# Patient Record
Sex: MALE | Race: WHITE | Employment: OTHER | URBAN - METROPOLITAN AREA
[De-identification: names, ages, dates, MRNs, and addresses within clinical notes are randomized per-mention and may not be internally consistent; named-entity substitution may affect disease eponyms.]

---

## 2017-05-22 PROCEDURE — 88305 TISSUE EXAM BY PATHOLOGIST: CPT | Performed by: INTERNAL MEDICINE

## 2017-07-21 ENCOUNTER — OFFICE VISIT (OUTPATIENT)
Dept: WOUND CARE | Facility: HOSPITAL | Age: 67
End: 2017-07-21
Attending: NURSE PRACTITIONER
Payer: MEDICARE

## 2017-07-21 ENCOUNTER — HOSPITAL ENCOUNTER (OUTPATIENT)
Dept: LAB | Facility: HOSPITAL | Age: 67
Discharge: HOME | End: 2017-07-21
Attending: NURSE PRACTITIONER
Payer: MEDICARE

## 2017-07-21 DIAGNOSIS — F17.218 NICOTINE DEPENDENCE, CIGARETTES, WITH OTHER NICOTINE-INDUCED DISORDERS: ICD-10-CM

## 2017-07-21 DIAGNOSIS — I87.332 CHRONIC VENOUS HYPERTENSION (IDIOPATHIC) WITH ULCER AND INFLAMMATION OF LEFT LOWER EXTREMITY (HCC): Primary | ICD-10-CM

## 2017-07-21 DIAGNOSIS — L97.929 CHRONIC VENOUS HYPERTENSION (IDIOPATHIC) WITH ULCER AND INFLAMMATION OF LEFT LOWER EXTREMITY (HCC): Primary | ICD-10-CM

## 2017-07-21 DIAGNOSIS — L97.822 NON-PRESSURE CHRONIC ULCER OF OTHER PART OF LEFT LOWER LEG WITH FAT LAYER EXPOSED (HCC): ICD-10-CM

## 2017-07-21 LAB
ALBUMIN SERPL-MCNC: 3.6 G/DL (ref 3.5–4.8)
ALP LIVER SERPL-CCNC: 48 U/L (ref 45–117)
ALT SERPL-CCNC: 26 U/L (ref 17–63)
AST SERPL-CCNC: 16 U/L (ref 15–41)
BASOPHILS # BLD AUTO: 0.07 X10(3) UL (ref 0–0.1)
BASOPHILS NFR BLD AUTO: 0.9 %
BILIRUB SERPL-MCNC: 0.4 MG/DL (ref 0.1–2)
BUN BLD-MCNC: 22 MG/DL (ref 8–20)
CALCIUM BLD-MCNC: 8.6 MG/DL (ref 8.3–10.3)
CHLORIDE: 108 MMOL/L (ref 101–111)
CO2: 28 MMOL/L (ref 22–32)
CREAT BLD-MCNC: 1.05 MG/DL (ref 0.7–1.3)
EOSINOPHIL # BLD AUTO: 0.33 X10(3) UL (ref 0–0.3)
EOSINOPHIL NFR BLD AUTO: 4.1 %
ERYTHROCYTE [DISTWIDTH] IN BLOOD BY AUTOMATED COUNT: 13.6 % (ref 11.5–16)
GLUCOSE BLD-MCNC: 101 MG/DL (ref 70–99)
HCT VFR BLD AUTO: 44.4 % (ref 37–53)
HGB BLD-MCNC: 14.7 G/DL (ref 13–17)
IMMATURE GRANULOCYTE COUNT: 0.02 X10(3) UL (ref 0–1)
IMMATURE GRANULOCYTE RATIO %: 0.2 %
LYMPHOCYTES # BLD AUTO: 2.06 X10(3) UL (ref 0.9–4)
LYMPHOCYTES NFR BLD AUTO: 25.5 %
M PROTEIN MFR SERPL ELPH: 7.1 G/DL (ref 6.1–8.3)
MCH RBC QN AUTO: 32 PG (ref 27–33.2)
MCHC RBC AUTO-ENTMCNC: 33.1 G/DL (ref 31–37)
MCV RBC AUTO: 96.5 FL (ref 80–99)
MONOCYTES # BLD AUTO: 0.77 X10(3) UL (ref 0.1–0.6)
MONOCYTES NFR BLD AUTO: 9.5 %
NEUTROPHIL ABS PRELIM: 4.82 X10 (3) UL (ref 1.3–6.7)
NEUTROPHILS # BLD AUTO: 4.82 X10(3) UL (ref 1.3–6.7)
NEUTROPHILS NFR BLD AUTO: 59.8 %
PLATELET # BLD AUTO: 266 10(3)UL (ref 150–450)
POTASSIUM SERPL-SCNC: 4.3 MMOL/L (ref 3.6–5.1)
PREALBUMIN: 26.9 MG/DL (ref 20–40)
RBC # BLD AUTO: 4.6 X10(6)UL (ref 3.8–5.8)
RED CELL DISTRIBUTION WIDTH-SD: 48.4 FL (ref 35.1–46.3)
SODIUM SERPL-SCNC: 141 MMOL/L (ref 136–144)
WBC # BLD AUTO: 8.1 X10(3) UL (ref 4–13)

## 2017-07-21 PROCEDURE — 99215 OFFICE O/P EST HI 40 MIN: CPT

## 2017-07-21 PROCEDURE — 29581 APPL MULTLAYER CMPRN SYS LEG: CPT

## 2017-07-21 PROCEDURE — 36415 COLL VENOUS BLD VENIPUNCTURE: CPT | Performed by: NURSE PRACTITIONER

## 2017-07-21 PROCEDURE — 80053 COMPREHEN METABOLIC PANEL: CPT | Performed by: NURSE PRACTITIONER

## 2017-07-21 PROCEDURE — 84134 ASSAY OF PREALBUMIN: CPT | Performed by: NURSE PRACTITIONER

## 2017-07-21 PROCEDURE — 85025 COMPLETE CBC W/AUTO DIFF WBC: CPT | Performed by: NURSE PRACTITIONER

## 2017-07-21 NOTE — PROGRESS NOTES
Chief Complaint  This information was obtained from the patient  Patient is here for a wound on his left lower leg. Patient states he scraped his leg on a lawn ornament in June.  Patient states he has been putting essential oil and he was putting lambs ear Current every day smoker, Marital Status - , Occupation - retired post office , Alcohol Use - occasional , Tobacco Use - pack and a half of cigerettes , Caffeine Use - All day    Past Medical History  This information was obtained from the patient, Daily Probiotic 2.5 billion cell capsule oral capsule oral  tamsulosin 0.4 mg capsule oral capsule,extended release 24hr oral  Calcium Magnesium 500 mg calcium-250 mg tablet oral tablet oral  cefadroxil 500 mg capsule oral capsule oral  flaxseed oral powde Wound #1 Left, Anterior Lower Leg is a chronic Full Thickness Venous Ulcer and has received a status of Not Healed. Initial wound encounter measurements are 1cm length x 1cm width x 0.1cm depth, with an area of 1 sq cm and a volume of 0.1 cubic cm.  No tunn patient is new to clinic. pcp is non ehv. I spent 30 minutes with the patient. See plan. More than 50% of that time was face to face and spent counseling the patient and/or on coordination of care.  The diagnosis, prognosis, and general treatment was explai look for Keny by [employer] (These are essential branch chain amino acids that help with tissue building and wound healing) and take 2 packets/day.  you can order online at [employer] or [employer] or some [employer] carry it or can order it for you  Decrease salt

## 2017-07-25 ENCOUNTER — OFFICE VISIT (OUTPATIENT)
Dept: WOUND CARE | Facility: HOSPITAL | Age: 67
End: 2017-07-25
Attending: NURSE PRACTITIONER
Payer: MEDICARE

## 2017-07-25 DIAGNOSIS — F17.218 NICOTINE DEPENDENCE, CIGARETTES, WITH OTHER NICOTINE-INDUCED DISORDERS: ICD-10-CM

## 2017-07-25 DIAGNOSIS — L97.929 CHRONIC VENOUS HYPERTENSION (IDIOPATHIC) WITH ULCER AND INFLAMMATION OF LEFT LOWER EXTREMITY (HCC): Primary | ICD-10-CM

## 2017-07-25 DIAGNOSIS — I87.332 CHRONIC VENOUS HYPERTENSION (IDIOPATHIC) WITH ULCER AND INFLAMMATION OF LEFT LOWER EXTREMITY (HCC): Primary | ICD-10-CM

## 2017-07-25 DIAGNOSIS — L97.822 NON-PRESSURE CHRONIC ULCER OF OTHER PART OF LEFT LOWER LEG WITH FAT LAYER EXPOSED (HCC): ICD-10-CM

## 2017-07-25 PROCEDURE — 29581 APPL MULTLAYER CMPRN SYS LEG: CPT

## 2017-07-28 ENCOUNTER — OFFICE VISIT (OUTPATIENT)
Dept: WOUND CARE | Facility: HOSPITAL | Age: 67
End: 2017-07-28
Attending: NURSE PRACTITIONER
Payer: MEDICARE

## 2017-07-28 DIAGNOSIS — F17.218 NICOTINE DEPENDENCE, CIGARETTES, WITH OTHER NICOTINE-INDUCED DISORDERS: ICD-10-CM

## 2017-07-28 DIAGNOSIS — L97.822 NON-PRESSURE CHRONIC ULCER OF OTHER PART OF LEFT LOWER LEG WITH FAT LAYER EXPOSED (HCC): ICD-10-CM

## 2017-07-28 DIAGNOSIS — L97.929 CHRONIC VENOUS HYPERTENSION (IDIOPATHIC) WITH ULCER AND INFLAMMATION OF LEFT LOWER EXTREMITY (HCC): Primary | ICD-10-CM

## 2017-07-28 DIAGNOSIS — I87.332 CHRONIC VENOUS HYPERTENSION (IDIOPATHIC) WITH ULCER AND INFLAMMATION OF LEFT LOWER EXTREMITY (HCC): Primary | ICD-10-CM

## 2017-07-28 PROCEDURE — 99213 OFFICE O/P EST LOW 20 MIN: CPT

## 2017-09-25 ENCOUNTER — CHARTING TRANS (OUTPATIENT)
Dept: OTHER | Age: 67
End: 2017-09-25

## 2017-12-14 ENCOUNTER — OFFICE VISIT (OUTPATIENT)
Dept: WOUND CARE | Facility: HOSPITAL | Age: 67
End: 2017-12-14
Attending: NURSE PRACTITIONER
Payer: MEDICARE

## 2017-12-14 DIAGNOSIS — F17.218 NICOTINE DEPENDENCE, CIGARETTES, WITH OTHER NICOTINE-INDUCED DISORDERS: ICD-10-CM

## 2017-12-14 DIAGNOSIS — L97.929 CHRONIC VENOUS HYPERTENSION (IDIOPATHIC) WITH ULCER AND INFLAMMATION OF LEFT LOWER EXTREMITY (HCC): Primary | ICD-10-CM

## 2017-12-14 DIAGNOSIS — L97.822 NON-PRESSURE CHRONIC ULCER OF OTHER PART OF LEFT LOWER LEG WITH FAT LAYER EXPOSED (HCC): ICD-10-CM

## 2017-12-14 DIAGNOSIS — I87.332 CHRONIC VENOUS HYPERTENSION (IDIOPATHIC) WITH ULCER AND INFLAMMATION OF LEFT LOWER EXTREMITY (HCC): Primary | ICD-10-CM

## 2017-12-14 PROCEDURE — 99214 OFFICE O/P EST MOD 30 MIN: CPT

## 2017-12-14 NOTE — PROGRESS NOTES
Subjective    Chief Complaint  This information was obtained from the patient  Pt here for initial visit, he reports approx. 2 weeks ago dry rough skin appeared to left lower leg.  He as putting hydrogen peroxide and essential oil after which leg festered a This information was obtained from the patient, chart  Current every day smoker - started vaping last week, Marital Status - , Occupation - retired post office , Alcohol Use - occasional , Tobacco Use - pack and a half of cigerettes , Caffeine Use - Cardiovascular (Central/Peripheral): Chest Pain, Dyspnea on Exertion, Intermittent Claudication, Lower extremity (leg) resting pain  Gastrointestinal (GI): Bowel Incontinence, Change in Bowel Habits, Nausea / Vomiting / Diarrhea (N/V/D), Loss of Appetite, RRR, S1, S2, No murmur, rubs or gallops. . Andrei +2 pedal pulses. Left leg erythema and significant edema, capillary refill <3, hair growth to bilateral LE. Musculoskeletal:  Steady gait . Integumentary (Hair, Skin)  Hives noted from head to toe.  Part Right Extremity colors, hair growth, and conditions:   Extremity Color: WNL   Hair Growth on Extremity: Yes   Temperature of Extremity: Warm   Capillary Refill: < 3 seconds      Additional Information  RIGHT- Heel to back of knee Measurement: 18.5 inches Initial visit for parital thckness wound. Patient with new DVT and chronic DVT per u/s in February. Pt being followed by Dr. Sherlyn Ledesma. Also presents with cellulitis and allergic reaction. Will prescribe steroids and antibiotics.  Erythema demarcated and patie

## 2017-12-20 ENCOUNTER — APPOINTMENT (OUTPATIENT)
Dept: WOUND CARE | Facility: HOSPITAL | Age: 67
End: 2017-12-20
Attending: NURSE PRACTITIONER
Payer: MEDICARE

## 2017-12-21 ENCOUNTER — OFFICE VISIT (OUTPATIENT)
Dept: WOUND CARE | Facility: HOSPITAL | Age: 67
End: 2017-12-21
Attending: NURSE PRACTITIONER
Payer: MEDICARE

## 2017-12-21 DIAGNOSIS — L97.822 NON-PRESSURE CHRONIC ULCER OF OTHER PART OF LEFT LOWER LEG WITH FAT LAYER EXPOSED (HCC): ICD-10-CM

## 2017-12-21 DIAGNOSIS — I87.332 CHRONIC VENOUS HYPERTENSION (IDIOPATHIC) WITH ULCER AND INFLAMMATION OF LEFT LOWER EXTREMITY (HCC): Primary | ICD-10-CM

## 2017-12-21 DIAGNOSIS — L97.929 CHRONIC VENOUS HYPERTENSION (IDIOPATHIC) WITH ULCER AND INFLAMMATION OF LEFT LOWER EXTREMITY (HCC): Primary | ICD-10-CM

## 2017-12-21 DIAGNOSIS — F17.218 NICOTINE DEPENDENCE, CIGARETTES, WITH OTHER NICOTINE-INDUCED DISORDERS: ICD-10-CM

## 2017-12-21 PROCEDURE — 29581 APPL MULTLAYER CMPRN SYS LEG: CPT

## 2017-12-21 NOTE — PROGRESS NOTES
Subjective    Chief Complaint  This information was obtained from the patient  Patient is here for a wound care follow up. He feels that the wound is doing well, and denies any current pain.     Allergies  cashews , ciprofloxacin, naproxen, shrimp, sulfate, Psychiatric: Depression (daughter  unexpectedly in 2017)    Patient denies complaints or symptoms related to:   Constitutional Symptoms (General Health): Fever  Eyes: Glasses / Contacts  Ear/Nose/Mouth/Throat: Hearing Loss / Aid  Respiratory: Mar Dominguez Wound #2 Left Lower Leg is a chronic Partial Thickness Venous Ulcer and has received a status of resolved. Subsequent wound encounter measurements are 0cm length x 0cm width with no measurable depth, with an area of 0 sq cm . No tunneling has been noted.  Patrica Ignacio Steroid cream/ointment - Clobetasol applied in clinic  Moisturizer to surrounding skin. - Aquaphor  Change dressing every: - 1 week    Additional Orders:    Compression Therapy:  Coflex calamine unna boot  Avoid prolonged standing in one place.   Elevate le

## 2017-12-28 ENCOUNTER — OFFICE VISIT (OUTPATIENT)
Dept: WOUND CARE | Facility: HOSPITAL | Age: 67
End: 2017-12-28
Attending: NURSE PRACTITIONER
Payer: MEDICARE

## 2017-12-28 DIAGNOSIS — I87.332 CHRONIC VENOUS HYPERTENSION (IDIOPATHIC) WITH ULCER AND INFLAMMATION OF LEFT LOWER EXTREMITY (HCC): Primary | ICD-10-CM

## 2017-12-28 DIAGNOSIS — L97.929 CHRONIC VENOUS HYPERTENSION (IDIOPATHIC) WITH ULCER AND INFLAMMATION OF LEFT LOWER EXTREMITY (HCC): Primary | ICD-10-CM

## 2017-12-28 DIAGNOSIS — F17.218 NICOTINE DEPENDENCE, CIGARETTES, WITH OTHER NICOTINE-INDUCED DISORDERS: ICD-10-CM

## 2017-12-28 DIAGNOSIS — L97.822 NON-PRESSURE CHRONIC ULCER OF OTHER PART OF LEFT LOWER LEG WITH FAT LAYER EXPOSED (HCC): ICD-10-CM

## 2017-12-28 PROCEDURE — 99212 OFFICE O/P EST SF 10 MIN: CPT

## 2017-12-28 NOTE — PROGRESS NOTES
Subjective    Chief Complaint  This information was obtained from the patient  Patient presents today for f/u of LLL wound. Pt states he believes the prior redness is due to an allergy to Eliquis.      Allergies  cashews , ciprofloxacin, naproxen, shrimp, s Genitourinary (): Decreased Force of Stream (On flomax)  Integumentary (Hair/Skin/Nails): Dryness, Rash, Hyperpigmentation, Itching  Psychiatric: Depression (daughter  unexpectedly in 2017)    Patient denies complaints or symptoms related to: Andrei +2 pedal pulses. No LE pain or edema, Capillary refill < 3 seconds. Digits are warm. Toenails are wnl for color, thickness and hygeine. + hairgrowth on legs and feet. .     Integumentary (Hair, Skin)  Warm, dry, well perfused, no rashes or lesions. M32.890: Non-pressure chronic ulcer of other part of left lower leg limited to breakdown of skin  R60.9: Edema, unspecified        Plan    Additional Orders:    Wound Cleansing & Dressings  May shower and/or cleanse wound with mild soap and water.   Steroid

## 2018-01-16 ENCOUNTER — HOSPITAL ENCOUNTER (OUTPATIENT)
Dept: INTERVENTIONAL RADIOLOGY/VASCULAR | Facility: HOSPITAL | Age: 68
Discharge: HOME | End: 2018-01-16
Attending: SURGERY | Admitting: SURGERY
Payer: MEDICARE

## 2018-01-16 VITALS
OXYGEN SATURATION: 93 % | HEIGHT: 72 IN | WEIGHT: 240 LBS | DIASTOLIC BLOOD PRESSURE: 68 MMHG | TEMPERATURE: 98 F | HEART RATE: 62 BPM | BODY MASS INDEX: 32.51 KG/M2 | RESPIRATION RATE: 16 BRPM | SYSTOLIC BLOOD PRESSURE: 117 MMHG

## 2018-01-16 DIAGNOSIS — L97.329 VENOUS ULCER OF ANKLE, LEFT (HCC): ICD-10-CM

## 2018-01-16 DIAGNOSIS — I83.023 VENOUS ULCER OF ANKLE, LEFT (HCC): ICD-10-CM

## 2018-01-16 PROCEDURE — B44GZZ3 ULTRASONOGRAPHY OF LEFT LOWER EXTREMITY ARTERIES, INTRAVASCULAR: ICD-10-PCS | Performed by: SURGERY

## 2018-01-16 PROCEDURE — B51CYZZ FLUOROSCOPY OF LEFT LOWER EXTREMITY VEINS USING OTHER CONTRAST: ICD-10-PCS | Performed by: SURGERY

## 2018-01-16 PROCEDURE — B549ZZ3 ULTRASONOGRAPHY OF INFERIOR VENA CAVA, INTRAVASCULAR: ICD-10-PCS | Performed by: SURGERY

## 2018-01-16 PROCEDURE — 76937 US GUIDE VASCULAR ACCESS: CPT

## 2018-01-16 PROCEDURE — 36005 INJECTION EXT VENOGRAPHY: CPT

## 2018-01-16 PROCEDURE — 37253 INTRVASC US NONCORONARY ADDL: CPT

## 2018-01-16 PROCEDURE — 36012 PLACE CATHETER IN VEIN: CPT

## 2018-01-16 PROCEDURE — 75820 VEIN X-RAY ARM/LEG: CPT

## 2018-01-16 PROCEDURE — B44LZZ3 ULTRASONOGRAPHY OF FEMORAL ARTERY, INTRAVASCULAR: ICD-10-PCS | Performed by: SURGERY

## 2018-01-16 PROCEDURE — 99152 MOD SED SAME PHYS/QHP 5/>YRS: CPT

## 2018-01-16 PROCEDURE — 37252 INTRVASC US NONCORONARY 1ST: CPT

## 2018-01-16 RX ORDER — MIDAZOLAM HYDROCHLORIDE 1 MG/ML
INJECTION INTRAMUSCULAR; INTRAVENOUS
Status: COMPLETED
Start: 2018-01-16 | End: 2018-01-16

## 2018-01-16 RX ORDER — HEPARIN SODIUM 5000 [USP'U]/ML
INJECTION, SOLUTION INTRAVENOUS; SUBCUTANEOUS
Status: COMPLETED
Start: 2018-01-16 | End: 2018-01-16

## 2018-01-16 RX ORDER — METHYLPREDNISOLONE SODIUM SUCCINATE 125 MG/2ML
INJECTION, POWDER, LYOPHILIZED, FOR SOLUTION INTRAMUSCULAR; INTRAVENOUS
Status: COMPLETED
Start: 2018-01-16 | End: 2018-01-16

## 2018-01-16 RX ORDER — SODIUM CHLORIDE 9 MG/ML
INJECTION, SOLUTION INTRAVENOUS CONTINUOUS
Status: DISCONTINUED | OUTPATIENT
Start: 2018-01-16 | End: 2018-01-16

## 2018-01-16 RX ORDER — DIPHENHYDRAMINE HYDROCHLORIDE 50 MG/ML
INJECTION INTRAMUSCULAR; INTRAVENOUS
Status: COMPLETED
Start: 2018-01-16 | End: 2018-01-16

## 2018-01-16 RX ORDER — LIDOCAINE HYDROCHLORIDE 10 MG/ML
INJECTION, SOLUTION EPIDURAL; INFILTRATION; INTRACAUDAL; PERINEURAL
Status: COMPLETED
Start: 2018-01-16 | End: 2018-01-16

## 2018-01-16 NOTE — BRIEF OP NOTE
Pre-Operative Diagnosis: Deep venous reflux and superficial venous reflux left leg     Post-Operative Diagnosis: Same     Procedure Performed:   1. Ultrasound-guided percutaneous access left saphenous vein  2. Venogram and venacavogram  3.   Intravascu

## 2018-01-16 NOTE — PROGRESS NOTES
Pt up to bathroom with no complaints. Left leg dressing cdi, no bleeding or hematoma. Iv removed. Discharge instructions reviewed with pt, copy given. Pt discharged via wheelchair by volunteer. Pt family member driving pt home.

## 2018-01-21 NOTE — OPERATIVE REPORT
Pre-Operative Diagnosis: Deep venous reflux and superficial venous reflux left leg     Post-Operative Diagnosis: Same     Procedure Performed:   1. Ultrasound-guided percutaneous access left saphenous vein  2. Venogram and venacavogram  3.   Intravascu femoral vein. There was no refluxing into the hypogastric. Over the J-wire an intravascular ultrasound catheter was advanced and a run was saved. Common femoral vein, external iliac vein, common iliac vein and vena cava were all patent without stenosis.

## 2018-05-26 PROBLEM — I26.99 OTHER ACUTE PULMONARY EMBOLISM WITHOUT ACUTE COR PULMONALE (HCC): Status: ACTIVE | Noted: 2018-05-26

## 2018-05-26 PROBLEM — I82.409 RECURRENT DEEP VEIN THROMBOSIS (DVT) (HCC): Status: ACTIVE | Noted: 2018-05-26

## 2018-11-02 VITALS
SYSTOLIC BLOOD PRESSURE: 160 MMHG | BODY MASS INDEX: 32.51 KG/M2 | HEART RATE: 68 BPM | TEMPERATURE: 98 F | HEIGHT: 72 IN | RESPIRATION RATE: 16 BRPM | WEIGHT: 240 LBS | DIASTOLIC BLOOD PRESSURE: 70 MMHG | OXYGEN SATURATION: 97 %

## 2018-11-13 PROCEDURE — 84153 ASSAY OF PSA TOTAL: CPT | Performed by: UROLOGY

## 2018-11-13 PROCEDURE — 36415 COLL VENOUS BLD VENIPUNCTURE: CPT | Performed by: UROLOGY

## 2018-11-13 PROCEDURE — 84154 ASSAY OF PSA FREE: CPT | Performed by: UROLOGY

## 2019-05-21 PROCEDURE — 82365 CALCULUS SPECTROSCOPY: CPT | Performed by: PHYSICIAN ASSISTANT

## 2019-05-24 PROCEDURE — 84392 ASSAY OF URINE SULFATE: CPT | Performed by: PHYSICIAN ASSISTANT

## 2019-05-24 PROCEDURE — 82507 ASSAY OF CITRATE: CPT | Performed by: PHYSICIAN ASSISTANT

## 2019-05-24 PROCEDURE — 83945 ASSAY OF OXALATE: CPT | Performed by: PHYSICIAN ASSISTANT

## 2019-05-24 PROCEDURE — 82436 ASSAY OF URINE CHLORIDE: CPT | Performed by: PHYSICIAN ASSISTANT

## 2019-05-24 PROCEDURE — 82340 ASSAY OF CALCIUM IN URINE: CPT | Performed by: PHYSICIAN ASSISTANT

## (undated) NOTE — LETTER
[facility] [address], [address]  Consent for Procedure/Sedation    Date:        Time:       1.  I authorize the performance upon Brannon Ruvalcaba the following:  VENOGRAM     2. I authorize Dr. Chris Berry (and whomever is designated a Witness: _________________________      Date: ___________________    Printed: 2018   12:46 PM  Patient Name: Dieudonne Maldonado        : 1950       Medical Record #: [id number]